# Patient Record
Sex: FEMALE | Race: WHITE
[De-identification: names, ages, dates, MRNs, and addresses within clinical notes are randomized per-mention and may not be internally consistent; named-entity substitution may affect disease eponyms.]

---

## 2019-07-29 ENCOUNTER — HOSPITAL ENCOUNTER (EMERGENCY)
Dept: HOSPITAL 74 - JERFT | Age: 24
Discharge: HOME | End: 2019-07-29
Payer: COMMERCIAL

## 2019-07-29 VITALS — BODY MASS INDEX: 23.6 KG/M2

## 2019-07-29 VITALS — HEART RATE: 86 BPM | DIASTOLIC BLOOD PRESSURE: 67 MMHG | SYSTOLIC BLOOD PRESSURE: 113 MMHG | TEMPERATURE: 98.4 F
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
## 2019-07-29 DIAGNOSIS — Z3A.27: ICD-10-CM

## 2019-07-29 DIAGNOSIS — S93.401A: ICD-10-CM
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
## 2019-07-29 DIAGNOSIS — O99.89: Primary | ICD-10-CM

## 2019-07-29 PROCEDURE — 2W3QX1Z IMMOBILIZATION OF RIGHT LOWER LEG USING SPLINT: ICD-10-PCS

## 2019-07-29 NOTE — PDOC
History of Present Illness





- General


Chief Complaint: Injury


Stated Complaint: RT ANKLE INJURY


Time Seen by Provider: 07/29/19 14:13





- History of Present Illness


Initial Comments: 





07/29/19 14:27


23-year-old female without comorbidities presents for evaluation of right ankle 

pain. She states while walking up steps she twisted her ankle. She describes an 

inversion injury which occurred last night. She is pregnant.





Past History





- Past Medical History


Anemia: No


COPD: No





- Immunization History


Immunization Up to Date: No





- Suicide/Smoking/Psychosocial Hx


Smoking History: Never smoked


Have you smoked in the past 12 months: No


Information on smoking cessation initiated: No


Hx Alcohol Use: No


Drug/Substance Use Hx: No





**Review of Systems





- Review of Systems


Musculoskeletal: Yes: Joint Pain





*Physical Exam





- Vital Signs


 Last Vital Signs











Temp Pulse Resp BP Pulse Ox


 


 98.4 F   86   18   113/67   98 


 


 07/29/19 14:10  07/29/19 14:10  07/29/19 14:10  07/29/19 14:10  07/29/19 14:10














- Physical Exam


Comments: 





07/29/19 14:27


Right ankle skin color and temperature are normal. There is minimal lateral 

swelling. No tenderness about the knee proximal fibula or along its distal 

course. No tenderness about the medial lateral malleolus. No tenderness about 

the navicular base of the fifth metatarsal. Mild tenderness over the ATFL. 

Neurovascularly intact no instability no gross sensory motor deficits.





Medical Decision Making





- Medical Decision Making





07/29/19 14:28


patient declined x-rays because of pregnancy. I agree with this. His unlikely 

there is a fracture. This is mild tenderness over the ATFL without bony 

tenderness. She is able to bear weight. Weight-bear as tolerated with crutches 

and Aircast follow-up





*DC/Admit/Observation/Transfer


Diagnosis at time of Disposition: 


 Acute right ankle pain, Ankle sprain








- Discharge Dispostion


Disposition: HOME


Condition at time of disposition: Stable


Decision to Admit order: No





- Referrals


Referrals: 


Adam Grier DO [Staff Physician] - 





- Patient Instructions


Printed Discharge Instructions:  Ankle Sprain, DI for Ankle Sprain


Additional Instructions: 


You may weight-bear as tolerated with crutches in the Aircast. Please be 

advised I am unable to rule out fracture because your refusal for an x-ray. It 

is unlikely you have a fracture but he should follow-up with orthopedics in 1-2 

days without fail for further evaluation and treatment options. Tylenol for 

pain as directed.





- Post Discharge Activity

## 2019-07-29 NOTE — PDOC
Rapid Medical Evaluation


Chief Complaint: Injury


Time Seen by Provider: 07/29/19 14:13


Medical Evaluation: 


Vital Signs











Temp Pulse Resp BP Pulse Ox


 


 98.4 F   86   18   113/67   98 


 


 07/29/19 14:10  07/29/19 14:10  07/29/19 14:10  07/29/19 14:10  07/29/19 14:10








07/29/19 14:13


I have performed a brief in-person evaluation of this patient.





The patient presents with a chief complaint of: 27wks pregnant present with 

complains right ankle pain s/pn twisting ankle going down stair





Pertinent physical exam findings: A&O x 3 in NAD





I have ordered the following: nothing





The patient will proceed to the ED for further evaluation.








**Discharge Disposition





- Diagnosis


 Acute right ankle pain








- Discharge Dispostion


Condition at time of disposition: Stable





- Referrals





- Patient Instructions





- Post Discharge Activity

## 2023-05-30 ENCOUNTER — OFFICE (OUTPATIENT)
Dept: URBAN - METROPOLITAN AREA CLINIC 30 | Facility: CLINIC | Age: 28
Setting detail: OPHTHALMOLOGY
End: 2023-05-30
Payer: COMMERCIAL

## 2023-05-30 DIAGNOSIS — H52.13: ICD-10-CM

## 2023-05-30 DIAGNOSIS — H16.223: ICD-10-CM

## 2023-05-30 PROCEDURE — 92004 COMPRE OPH EXAM NEW PT 1/>: CPT | Performed by: OPHTHALMOLOGY

## 2023-05-30 PROCEDURE — 92015 DETERMINE REFRACTIVE STATE: CPT | Performed by: OPHTHALMOLOGY

## 2023-05-30 ASSESSMENT — REFRACTION_AUTOREFRACTION
OD_SPHERE: -3.25
OD_CYLINDER: +0.25
OD_AXIS: 64
OS_CYLINDER: +0.50
OS_SPHERE: -3.50
OS_AXIS: 109

## 2023-05-30 ASSESSMENT — TONOMETRY
OD_IOP_MMHG: 16
OS_IOP_MMHG: 17

## 2023-05-30 ASSESSMENT — CONFRONTATIONAL VISUAL FIELD TEST (CVF)
OD_FINDINGS: FULL
OS_FINDINGS: FULL

## 2023-05-30 ASSESSMENT — REFRACTION_CURRENTRX
OD_SPHERE: -3.00
OS_SPHERE: -2.50
OD_CYLINDER: +0.25
OD_AXIS: 170
OD_VPRISM_DIRECTION: SV
OD_OVR_VA: 20/
OS_OVR_VA: 20/
OS_CYLINDER: SPH
OS_VPRISM_DIRECTION: SV

## 2023-05-30 ASSESSMENT — REFRACTION_MANIFEST
OD_SPHERE: -3.00
OS_CYLINDER: SPH
OS_VA1: 20/20
OD_VA1: 20/20
OS_SPHERE: -2.50
OD_CYLINDER: SPH

## 2023-05-30 ASSESSMENT — VISUAL ACUITY
OS_BCVA: 20/25+1
OD_BCVA: 20/20-1

## 2023-05-30 ASSESSMENT — SPHEQUIV_DERIVED
OS_SPHEQUIV: -3.25
OD_SPHEQUIV: -3.125